# Patient Record
Sex: FEMALE | Race: WHITE | NOT HISPANIC OR LATINO | Employment: STUDENT | URBAN - METROPOLITAN AREA
[De-identification: names, ages, dates, MRNs, and addresses within clinical notes are randomized per-mention and may not be internally consistent; named-entity substitution may affect disease eponyms.]

---

## 2020-05-04 ENCOUNTER — NURSE TRIAGE (OUTPATIENT)
Dept: NURSING | Facility: CLINIC | Age: 25
End: 2020-05-04

## 2020-05-05 NOTE — TELEPHONE ENCOUNTER
Pt has Crohn's disease and another autoimmune disease and is having an infusion for them at 8:30 am tomorrow. Tonight she is developing a sore throat with red bumps on the back of the throat. Is concerned if it's covid-19 and if safe to be seen in clinic tomorrow for her infusion. Is there a provider at [facility] she can talk with? Informed the UC's or ED's are the places to be seen if needed. 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 [facility]. They can let her know what her next steps of care need to be and how to cancel infusion if needed. Patient voices understanding and is in agreement with this plan.     Shirin Loza RN  [facility]  [facility]

## 2020-05-05 NOTE — TELEPHONE ENCOUNTER
"  Additional Information    Negative: Severe difficulty breathing (e.g., struggling for each breath, speaks in single words, stridor)    Negative: Sounds like a life-threatening emergency to the triager    Negative: [1] Diagnosed strep throat AND [2] taking antibiotic AND [3] symptoms continue    Negative: Throat culture results, call about    Negative: Productive cough is main symptom    Negative: Non-productive cough is main symptom    Negative: Hoarseness is main symptom    Negative: Runny nose is main symptom    Negative: [1] Drooling or spitting out saliva (because can't swallow) AND [2] normal breathing    Negative: Unable to open mouth completely    Negative: [1] Difficulty breathing AND [2] not severe    Negative: Fever > 104 F (40 C)    Negative: [1] Refuses to drink anything AND [2] for > 12 hours    Negative: [1] Drinking very little AND [2] dehydration suspected (e.g., no urine > 12 hours, very dry mouth, very lightheaded)    Negative: Patient sounds very sick or weak to the triager    Negative: SEVERE (e.g., excruciating) throat pain    Negative: [1] Pus on tonsils (back of throat) AND [2]  fever AND [3] swollen neck lymph nodes (\"glands\")    Negative: [1] Rash AND [2] widespread (especially chest and abdomen)    Negative: Earache also present    Negative: Fever present > 3 days (72 hours)    Negative: Diabetes mellitus or weak immune system (e.g., HIV positive, cancer chemo, splenectomy, organ transplant)    Negative: History of rheumatic fever    Negative: [1] Adult is leaving on a trip AND [2] requests an antibiotic NOW    Negative: [1] Positive throat culture or rapid strep test (according to lab, PCP, caller, etc.) AND [2] NO  standing order to call in prescription for antibiotic    Negative: [1] Exposure to family member (or spouse or boyfriend/girlfriend) with test-proven strep AND [2] within last 10 days    Negative: [1] Sore throat is the only symptom AND [2] present > 48 hours    Negative: [1] " Sore throat with cough/cold symptoms AND [2] present > 5 days    [1] Sore throat is the only symptom AND [2] sore throat present < 48 hours    Protocols used: SORE THROAT-A-AH

## 2020-06-20 ENCOUNTER — OFFICE VISIT (OUTPATIENT)
Dept: URGENT CARE | Facility: URGENT CARE | Age: 25
End: 2020-06-20
Payer: COMMERCIAL

## 2020-06-20 ENCOUNTER — NURSE TRIAGE (OUTPATIENT)
Dept: NURSING | Facility: CLINIC | Age: 25
End: 2020-06-20

## 2020-06-20 VITALS
SYSTOLIC BLOOD PRESSURE: 122 MMHG | WEIGHT: 118 LBS | HEART RATE: 100 BPM | DIASTOLIC BLOOD PRESSURE: 77 MMHG | OXYGEN SATURATION: 100 % | TEMPERATURE: 97.8 F

## 2020-06-20 DIAGNOSIS — L05.01 PILONIDAL CYST WITH ABSCESS: Primary | ICD-10-CM

## 2020-06-20 PROCEDURE — 10080 I&D PILONIDAL CYST SIMPLE: CPT | Performed by: FAMILY MEDICINE

## 2020-06-20 NOTE — TELEPHONE ENCOUNTER
Caller states that she think she has a recurrent pilonidal cyst; red swollen painful area at base of tailbone; has  appeared before but then drained on its own; now is larger red warm and very painful. No recorded fever but feels warm  Triage protocol reviewed   advised assessment within 4 hours  Caller undertands and will comply  Chela Bush RN  [facility]        Reason for Disposition    SEVERE pain (e.g., excruciating)    Additional Information    Negative: Blood in stools or rectal bleeding without a stool    Negative: Rectal or anal pain caused by constipation    Negative: Rash or pain caused by diarrhea    Negative: Pinworms seen    Negative: Other worm seen in the stool    Negative: Could be from sexual abuse    Negative: [1] Rectal foreign body AND [2] sharp    Negative: [1] Rectal foreign body AND [2] bleeding from rectum    Negative: [1] Rectal foreign body (inserted) AND [2] causing pain or discomfort AND [3] FB not expelled by glycerin suppositories    Negative: [1] Fever AND [2] red, painful skin around the anus    Negative: [1] Red/purple tissue protrudes from the anus by caller's report AND [2] persists > 1 hour    Negative: [1] SEVERE pain inside the rectum AND [2] unexplained    Looks infected (e.g. draining sore, spreading redness)    Negative: [1] Widespread rash AND [2] bright red, sunburn-like AND [3] too weak to stand    Negative: Sounds like a life-threatening emergency to the triager    Negative: Painful lump or swelling at opening to anus (rectum)    Negative: Painful lump or swelling at opening to vagina (on labia)    Negative: Painful lump or swelling on scrotum    Negative: Impetigo suspected or diagnosed    Negative: Doesn't match the SYMPTOMS of a boil    Negative: MRSA, questions about (No boil or other skin lesion)    Negative: Widespread red rash    Negative: Black (necrotic) color or blisters develop in wound    Negative: Patient sounds very sick or weak to the triager    Protocols  used: BOIL (SKIN ABSCESS)-A-AH, RECTAL AND ANUS SYMPTOMS-P-AH

## 2020-06-20 NOTE — PROGRESS NOTES
Subjective: Patient for years has off and on had a swelling in the sacral area that would grade in the area between her cheeks but in the last few days it has swollen up and is not draining on its own and has gotten very painful and she feels a little sick like maybe a fever although she does not have one now, was nervous about coming in earlier.    Objective: I cannot see the opening of the pilonidal cyst although she points to where it was but her swelling is now quite superior to that in the whole sacral area.  I picked with a spot that had largest soft area centrally and injected with local Xylocaine with epinephrine.  After making an incision I used a sterile scissors to go deeper until he had a pocket of pus.  I then drained about 4 mL of pus which deflated her major swelling in that area.    Assessment and plan: Incision and drainage of infected pilonidal cyst.  She will need to see a surgeon in the next couple of weeks to make plans for definitive removal of the pilonidal cyst.  She does have immunocompromise with Remicade so definitely needs antibiotics, Augmentin twice a day for 10 days, sits baths, continue to drain it.  I did not feel a wick was indicated.  The incision was about 1 cm long.

## 2021-04-12 ENCOUNTER — IMMUNIZATION (OUTPATIENT)
Dept: NURSING | Facility: CLINIC | Age: 26
End: 2021-04-12
Payer: COMMERCIAL

## 2021-04-12 PROCEDURE — 91300 PR COVID VAC PFIZER DIL RECON 30 MCG/0.3 ML IM: CPT

## 2021-04-12 PROCEDURE — 0001A PR COVID VAC PFIZER DIL RECON 30 MCG/0.3 ML IM: CPT

## 2021-05-01 ENCOUNTER — HEALTH MAINTENANCE LETTER (OUTPATIENT)
Age: 26
End: 2021-05-01

## 2021-05-03 ENCOUNTER — IMMUNIZATION (OUTPATIENT)
Dept: NURSING | Facility: CLINIC | Age: 26
End: 2021-05-03
Attending: INTERNAL MEDICINE
Payer: COMMERCIAL

## 2021-05-03 PROCEDURE — 91300 PR COVID VAC PFIZER DIL RECON 30 MCG/0.3 ML IM: CPT

## 2021-05-03 PROCEDURE — 0002A PR COVID VAC PFIZER DIL RECON 30 MCG/0.3 ML IM: CPT

## 2021-10-10 ENCOUNTER — HEALTH MAINTENANCE LETTER (OUTPATIENT)
Age: 26
End: 2021-10-10

## 2021-12-20 ENCOUNTER — IMMUNIZATION (OUTPATIENT)
Dept: NURSING | Facility: CLINIC | Age: 26
End: 2021-12-20
Payer: COMMERCIAL

## 2021-12-20 PROCEDURE — 91300 PR COVID VAC PFIZER DIL RECON 30 MCG/0.3 ML IM: CPT

## 2021-12-20 PROCEDURE — 0004A PR COVID VAC PFIZER DIL RECON 30 MCG/0.3 ML IM: CPT

## 2022-05-22 ENCOUNTER — HEALTH MAINTENANCE LETTER (OUTPATIENT)
Age: 27
End: 2022-05-22

## 2022-09-24 ENCOUNTER — HEALTH MAINTENANCE LETTER (OUTPATIENT)
Age: 27
End: 2022-09-24

## 2023-06-04 ENCOUNTER — HEALTH MAINTENANCE LETTER (OUTPATIENT)
Age: 28
End: 2023-06-04

## 2024-07-14 ENCOUNTER — HEALTH MAINTENANCE LETTER (OUTPATIENT)
Age: 29
End: 2024-07-14